# Patient Record
Sex: FEMALE | Race: BLACK OR AFRICAN AMERICAN
[De-identification: names, ages, dates, MRNs, and addresses within clinical notes are randomized per-mention and may not be internally consistent; named-entity substitution may affect disease eponyms.]

---

## 2018-02-17 PROBLEM — J96.00 ACUTE RESPIRATORY FAILURE (HCC): Status: ACTIVE | Noted: 2018-02-17

## 2018-02-18 PROBLEM — J96.01 ACUTE RESPIRATORY FAILURE WITH HYPOXIA (HCC): Status: ACTIVE | Noted: 2018-02-17

## 2018-09-14 PROBLEM — H10.9 CONJUNCTIVITIS OF LEFT EYE: Status: ACTIVE | Noted: 2018-09-14

## 2021-10-11 PROBLEM — Z98.84 HISTORY OF ROUX-EN-Y GASTRIC BYPASS: Status: ACTIVE | Noted: 2021-10-11

## 2021-10-11 PROBLEM — J44.9 CHRONIC OBSTRUCTIVE PULMONARY DISEASE (HCC): Status: ACTIVE | Noted: 2021-10-11

## 2021-10-11 PROBLEM — H10.9 CONJUNCTIVITIS OF LEFT EYE: Status: RESOLVED | Noted: 2018-09-14 | Resolved: 2021-10-11

## 2021-10-11 PROBLEM — G89.4 CHRONIC PAIN DISORDER: Status: ACTIVE | Noted: 2021-10-11

## 2021-10-11 PROBLEM — E78.00 HYPERCHOLESTEROLEMIA: Status: ACTIVE | Noted: 2021-10-11

## 2021-10-11 PROBLEM — E66.9 OBESITY: Status: ACTIVE | Noted: 2021-10-11

## 2021-10-11 PROBLEM — J96.01 ACUTE RESPIRATORY FAILURE WITH HYPOXIA (HCC): Status: RESOLVED | Noted: 2018-02-17 | Resolved: 2021-10-11

## 2021-10-11 PROBLEM — E78.00 HYPERCHOLESTEROLEMIA: Status: RESOLVED | Noted: 2021-10-11 | Resolved: 2021-10-11

## 2021-10-11 PROBLEM — M17.9 OSTEOARTHRITIS OF KNEE: Status: ACTIVE | Noted: 2021-10-11

## 2022-10-06 PROBLEM — L03.90 CELLULITIS: Status: ACTIVE | Noted: 2022-10-06

## 2022-10-07 PROBLEM — L03.311 ABDOMINAL WALL CELLULITIS: Status: ACTIVE | Noted: 2022-10-07

## 2022-10-13 PROBLEM — Z79.2 LONG TERM CURRENT USE OF ANTIBIOTICS: Status: ACTIVE | Noted: 2022-10-13

## 2023-02-17 ENCOUNTER — NURSE TRIAGE (OUTPATIENT)
Dept: OTHER | Facility: CLINIC | Age: 63
End: 2023-02-17

## 2023-02-17 NOTE — TELEPHONE ENCOUNTER
Location of patient:     Subjective: Caller (sister) states patient called her at work, patient took 4 B/P pills this morning by accident, thought  they were Ibuprofen,usual dose of B/P pill is one tablet    Triage is limited,patient is not present during call. Caller does not know the name of the B/P med or if patient is having any symptoms. When advised to call [facility], caller became angry, said writer was \"no help\", \" \"I just want to know if I need to take her to ER or not\", states she wants to take the survey and wants me to hang up so she can take the survey    Recommended disposition: Call [facility]. Care advice provided, patient verbalizes understanding; denies any other questions or concerns; instructed to call back for any new or worsening symptoms. Outcome; Caller became angry, states wants to end call and take survey     This triage is a result of a call to [address] [place] [address]. Please do not respond to the triage nurse through this encounter. Any subsequent communication should be directly with the patient.     Reason for Disposition   MORE THAN A DOUBLE DOSE of a prescription or over-the-counter (OTC) drug    Protocols used: Poisoning-ADULT-AH